# Patient Record
Sex: MALE | URBAN - METROPOLITAN AREA
[De-identification: names, ages, dates, MRNs, and addresses within clinical notes are randomized per-mention and may not be internally consistent; named-entity substitution may affect disease eponyms.]

---

## 2023-07-14 ENCOUNTER — OFFICE VISIT (OUTPATIENT)
Dept: PRIMARY CARE | Facility: CLINIC | Age: 53
End: 2023-07-14
Payer: COMMERCIAL

## 2023-07-14 VITALS
WEIGHT: 173 LBS | HEART RATE: 84 BPM | DIASTOLIC BLOOD PRESSURE: 62 MMHG | SYSTOLIC BLOOD PRESSURE: 94 MMHG | OXYGEN SATURATION: 96 % | BODY MASS INDEX: 26.3 KG/M2

## 2023-07-14 DIAGNOSIS — R05.3 CHRONIC COUGH: ICD-10-CM

## 2023-07-14 DIAGNOSIS — Z12.11 COLON CANCER SCREENING: ICD-10-CM

## 2023-07-14 DIAGNOSIS — K21.9 LARYNGOPHARYNGEAL REFLUX (LPR): Primary | ICD-10-CM

## 2023-07-14 DIAGNOSIS — J45.20 MILD INTERMITTENT ASTHMA, UNSPECIFIED WHETHER COMPLICATED (HHS-HCC): ICD-10-CM

## 2023-07-14 DIAGNOSIS — J30.0 VASOMOTOR RHINITIS: ICD-10-CM

## 2023-07-14 PROBLEM — S06.0X0A CONCUSSION WITHOUT LOSS OF CONSCIOUSNESS: Status: ACTIVE | Noted: 2023-07-14

## 2023-07-14 PROCEDURE — 99214 OFFICE O/P EST MOD 30 MIN: CPT | Performed by: INTERNAL MEDICINE

## 2023-07-14 PROCEDURE — 1036F TOBACCO NON-USER: CPT | Performed by: INTERNAL MEDICINE

## 2023-07-14 RX ORDER — OMEPRAZOLE 40 MG/1
40 CAPSULE, DELAYED RELEASE ORAL
Qty: 30 CAPSULE | Refills: 0 | Status: SHIPPED | OUTPATIENT
Start: 2023-07-14 | End: 2023-08-07 | Stop reason: SDUPTHER

## 2023-07-14 RX ORDER — ALBUTEROL SULFATE 90 UG/1
2 AEROSOL, METERED RESPIRATORY (INHALATION) EVERY 4 HOURS PRN
COMMUNITY
Start: 2018-05-21

## 2023-07-14 ASSESSMENT — PATIENT HEALTH QUESTIONNAIRE - PHQ9
2. FEELING DOWN, DEPRESSED OR HOPELESS: NOT AT ALL
1. LITTLE INTEREST OR PLEASURE IN DOING THINGS: NOT AT ALL
SUM OF ALL RESPONSES TO PHQ9 QUESTIONS 1 AND 2: 0

## 2023-07-14 NOTE — PROGRESS NOTES
Subjective   Patient ID: Nilay Fan is a 53 y.o. male who presents for Cough (Patient complaining of a cough, that caused him to pass out and hit his head).    HPI     Patient is a 53-year-old male with past history of asthma and chronic cough who presents with chief complaint of syncopal event this past Wednesday.  He states that he was coughing quite heavily and fell on his head.  He did not go to the emergency room.  He has a little bit of a headache but is improving.  No visual disturbances.  He states he has been having this chronic cough no for several years.  It seems to be dating back to 2015 at least in the records that are currently available.  He is supposed to be taking his albuterol as needed as well as Qvar for maintenance however he does not take the Qvar.  In fact he does not take any medications.  He states that the cough often is exacerbated by foods  No shortness of breath associated with it.  No trouble swallowing and no history of strokes    Review of Systems  Constitutional: No fever or chills  Cardiovascular: no chest pain, no palpitations and no syncope.   Respiratory: no cough, no shortness of breath during exertion and no shortness of breath at rest.   Gastrointestinal: no abdominal pain, no nausea and no vomiting.  Neuro: No Headache, no dizziness    Objective   BP 94/62   Pulse 84   Wt 78.5 kg (173 lb)   SpO2 96%   BMI 26.30 kg/m²     Physical Exam  Constitutional: Alert and in no acute distress. Well developed, well nourished  Head and Face: Head and face: Normal.    Cardiovascular: Heart rate and rhythm were normal, normal S1 and S2. No peripheral edema.   Pulmonary: Diffuse mild wheeze throughout  Musculoskeletal: Gait and station: Normal. Muscle strength/tone: Normal.   Skin: Normal skin color and pigmentation, normal skin turgor, and no rash.    Psychiatric: Judgment and insight: Intact. Mood and affect: Normal.        Lab Results   Component Value Date    WBC 4.7 11/20/2020     HGB 15.1 11/20/2020    HCT 46.9 11/20/2020     11/20/2020    CHOL 165 11/20/2020    TRIG 69 11/20/2020    HDL 44.5 11/20/2020    ALT 22 11/20/2020    AST 18 11/20/2020     11/20/2020    K 3.9 11/20/2020     11/20/2020    CREATININE 1.00 11/20/2020    BUN 14 11/20/2020    CO2 30 11/20/2020       No image results found.            Assessment/Plan   Problem List Items Addressed This Visit          Gastrointestinal and Abdominal    Laryngopharyngeal reflux (LPR) - Primary    Relevant Medications    omeprazole (PriLOSEC) 40 mg DR capsule       Pulmonary and Pneumonias    Chronic cough     Other Visit Diagnoses       Vasomotor rhinitis        Relevant Orders    Referral to ENT    Colon cancer screening        Relevant Orders    Cologuard® colon cancer screening    Mild intermittent asthma, unspecified whether complicated        Relevant Orders    Pulmonary function testing    XR chest 2 views

## 2023-07-14 NOTE — ASSESSMENT & PLAN NOTE
Patient with head injury greater than 5 days ago.  No focal neurological deficits.  Patient with mild headache.  Likely due to a concussion.  Advised against loud music and bright lights.  Try to get adequate sleep at night

## 2023-07-14 NOTE — ASSESSMENT & PLAN NOTE
There are many reasons for chronic cough.  In this case he has been adequately worked up.  Considering the symptoms are worse with meals we will trial a course of omeprazole 40 mg once daily in the morning.  Referral to ENT for possible endoscopic evaluation.  Check pulmonary function test given active wheezing and history of asthma and will also check chest x-ray.  Follow-up 30 days for reevaluation

## 2023-07-21 ENCOUNTER — APPOINTMENT (OUTPATIENT)
Dept: PRIMARY CARE | Facility: CLINIC | Age: 53
End: 2023-07-21
Payer: COMMERCIAL

## 2023-08-05 DIAGNOSIS — K21.9 LARYNGOPHARYNGEAL REFLUX (LPR): ICD-10-CM

## 2023-08-07 RX ORDER — OMEPRAZOLE 40 MG/1
40 CAPSULE, DELAYED RELEASE ORAL
Qty: 30 CAPSULE | Refills: 0 | Status: SHIPPED | OUTPATIENT
Start: 2023-08-07 | End: 2023-09-06

## 2023-08-15 ENCOUNTER — OFFICE VISIT (OUTPATIENT)
Dept: PRIMARY CARE | Facility: CLINIC | Age: 53
End: 2023-08-15
Payer: COMMERCIAL

## 2023-08-15 VITALS
BODY MASS INDEX: 26.46 KG/M2 | WEIGHT: 174 LBS | SYSTOLIC BLOOD PRESSURE: 98 MMHG | DIASTOLIC BLOOD PRESSURE: 61 MMHG | HEART RATE: 92 BPM

## 2023-08-15 DIAGNOSIS — R05.3 CHRONIC COUGH: ICD-10-CM

## 2023-08-15 DIAGNOSIS — J45.20 MILD INTERMITTENT ASTHMA, UNSPECIFIED WHETHER COMPLICATED (HHS-HCC): ICD-10-CM

## 2023-08-15 DIAGNOSIS — K21.9 LARYNGOPHARYNGEAL REFLUX (LPR): Primary | ICD-10-CM

## 2023-08-15 PROBLEM — J45.909 ASTHMA (HHS-HCC): Status: ACTIVE | Noted: 2023-08-15

## 2023-08-15 PROCEDURE — 99214 OFFICE O/P EST MOD 30 MIN: CPT | Performed by: INTERNAL MEDICINE

## 2023-08-15 PROCEDURE — 1036F TOBACCO NON-USER: CPT | Performed by: INTERNAL MEDICINE

## 2023-08-15 RX ORDER — METHYLPREDNISOLONE 4 MG/1
TABLET ORAL
Qty: 21 TABLET | Refills: 0 | Status: SHIPPED | OUTPATIENT
Start: 2023-08-15 | End: 2023-08-22

## 2023-08-15 NOTE — PROGRESS NOTES
Subjective   Patient ID: Nilay Fan is a 53 y.o. male who presents for Follow-up.    HPI     Patient is a 53-year-old male with past history of asthma and chronic cough who presents for follow-up.  Patient last visit was concerned about an ongoing cough.  There was concern whether this might be asthma related or pharyngeal reflux.  He was started on omeprazole 40 mg once daily and he states that his symptoms have improved substantially about 80% since then.  He states his coughing has improved.  Of note he was started on the Qvar however he has not been taking it.  He is also not been taking his albuterol.  He took a pulmonary function test showing mild obstruction without bronchodilator response.  He did not has no history of smoking.  His chest x-ray was within normal limits      Review of Systems  Constitutional: No fever or chills  Cardiovascular: no chest pain, no palpitations and no syncope.   Respiratory: no cough, no shortness of breath during exertion and no shortness of breath at rest.   Gastrointestinal: no abdominal pain, no nausea and no vomiting.  Neuro: No Headache, no dizziness    Objective   BP 98/61   Pulse 92   Wt 78.9 kg (174 lb)   BMI 26.46 kg/m²     Physical Exam  Constitutional: Alert and in no acute distress. Well developed, well nourished  Head and Face: Head and face: Normal.    Cardiovascular: Heart rate and rhythm were normal, normal S1 and S2. No peripheral edema.   Pulmonary: Diffuse mild wheeze throughout  Musculoskeletal: Gait and station: Normal. Muscle strength/tone: Normal.   Skin: Normal skin color and pigmentation, normal skin turgor, and no rash.    Psychiatric: Judgment and insight: Intact. Mood and affect: Normal.        Lab Results   Component Value Date    WBC 4.7 11/20/2020    HGB 15.1 11/20/2020    HCT 46.9 11/20/2020     11/20/2020    CHOL 165 11/20/2020    TRIG 69 11/20/2020    HDL 44.5 11/20/2020    ALT 22 11/20/2020    AST 18 11/20/2020     11/20/2020     K 3.9 11/20/2020     11/20/2020    CREATININE 1.00 11/20/2020    BUN 14 11/20/2020    CO2 30 11/20/2020       XR chest 2 views  Narrative: Interpreted By:  VALERIE TURNER MD  MRN: [id number]  Patient Name: FABI SALAS     STUDY:  TH CHEST 2 VIEW PA AND LAT;     INDICATION:  asthma.     COMPARISON:  None.     ACCESSION NUMBER(S):  [id number]     ORDERING CLINICIAN:  CHUCK LEPE     FINDINGS:  The cardiomediastinal silhouette size is within normal limits.     There is no focal consolidation, edema or pneumothorax.  No sizeable pleural effusion.     Impression: 1. No acute cardiopulmonary process.            Assessment/Plan   Problem List Items Addressed This Visit          Gastrointestinal and Abdominal    Laryngopharyngeal reflux (LPR) - Primary       Pulmonary and Pneumonias    Chronic cough     Improving.  There may be a component of laryngeal reflux.  He has an upcoming appointment with ear nose and throat.  Although considering he is actively wheezing it may be that his asthma is not well controlled.  Advised to use his Qvar regularly as well as albuterol as needed.  We will provide Medrol at this time for symptomatic relief         Asthma     Given his active wheezing is likely his symptoms or not well controlled.  Given the pulmonary function test showing mild obstruction without bronchodilator response the question is whether or not there is COPD.  We will provide Medrol at this time.  Advise regular use of his Qvar.  Would like to repeat the pulmonary function test in 3 months.  If there is still evidence of COPD may need pulmonary evaluation given no smoking history and evidence of mild obstruction         Relevant Medications    methylPREDNISolone (Medrol Dospak) 4 mg tablets

## 2023-08-15 NOTE — ASSESSMENT & PLAN NOTE
Given his active wheezing is likely his symptoms or not well controlled.  Given the pulmonary function test showing mild obstruction without bronchodilator response the question is whether or not there is COPD.  We will provide Medrol at this time.  Advise regular use of his Qvar.  Would like to repeat the pulmonary function test in 3 months.  If there is still evidence of COPD may need pulmonary evaluation given no smoking history and evidence of mild obstruction

## 2023-08-15 NOTE — ASSESSMENT & PLAN NOTE
Improving.  There may be a component of laryngeal reflux.  He has an upcoming appointment with ear nose and throat.  Although considering he is actively wheezing it may be that his asthma is not well controlled.  Advised to use his Qvar regularly as well as albuterol as needed.  We will provide Medrol at this time for symptomatic relief

## 2023-09-02 DIAGNOSIS — K21.9 LARYNGOPHARYNGEAL REFLUX (LPR): ICD-10-CM

## 2023-09-06 RX ORDER — OMEPRAZOLE 40 MG/1
40 CAPSULE, DELAYED RELEASE ORAL EVERY MORNING
Qty: 90 CAPSULE | Refills: 0 | Status: SHIPPED | OUTPATIENT
Start: 2023-09-06 | End: 2023-11-09 | Stop reason: SDUPTHER

## 2023-09-08 DIAGNOSIS — K21.9 LARYNGOPHARYNGEAL REFLUX (LPR): ICD-10-CM

## 2023-09-08 RX ORDER — OMEPRAZOLE 40 MG/1
40 CAPSULE, DELAYED RELEASE ORAL
Qty: 30 CAPSULE | OUTPATIENT
Start: 2023-09-08

## 2023-11-09 ENCOUNTER — LAB (OUTPATIENT)
Dept: LAB | Facility: LAB | Age: 53
End: 2023-11-09
Payer: COMMERCIAL

## 2023-11-09 ENCOUNTER — OFFICE VISIT (OUTPATIENT)
Dept: OTOLARYNGOLOGY | Facility: CLINIC | Age: 53
End: 2023-11-09
Payer: COMMERCIAL

## 2023-11-09 VITALS — BODY MASS INDEX: 23.99 KG/M2 | HEIGHT: 68 IN | TEMPERATURE: 97.2 F | WEIGHT: 158.3 LBS

## 2023-11-09 DIAGNOSIS — J31.0 CHRONIC RHINITIS: ICD-10-CM

## 2023-11-09 DIAGNOSIS — R09.81 NASAL CONGESTION: ICD-10-CM

## 2023-11-09 DIAGNOSIS — J34.89 NASAL OBSTRUCTION: ICD-10-CM

## 2023-11-09 DIAGNOSIS — J34.89 RHINORRHEA: ICD-10-CM

## 2023-11-09 DIAGNOSIS — K21.9 LARYNGOPHARYNGEAL REFLUX (LPR): Primary | ICD-10-CM

## 2023-11-09 DIAGNOSIS — J34.2 NASAL SEPTAL DEVIATION: ICD-10-CM

## 2023-11-09 PROCEDURE — 36415 COLL VENOUS BLD VENIPUNCTURE: CPT

## 2023-11-09 PROCEDURE — 1036F TOBACCO NON-USER: CPT | Performed by: OTOLARYNGOLOGY

## 2023-11-09 PROCEDURE — 82785 ASSAY OF IGE: CPT

## 2023-11-09 PROCEDURE — 99203 OFFICE O/P NEW LOW 30 MIN: CPT | Performed by: OTOLARYNGOLOGY

## 2023-11-09 PROCEDURE — 86003 ALLG SPEC IGE CRUDE XTRC EA: CPT

## 2023-11-09 PROCEDURE — 31231 NASAL ENDOSCOPY DX: CPT | Performed by: OTOLARYNGOLOGY

## 2023-11-09 RX ORDER — OMEPRAZOLE 40 MG/1
40 CAPSULE, DELAYED RELEASE ORAL EVERY MORNING
Qty: 90 CAPSULE | Refills: 0 | Status: SHIPPED | OUTPATIENT
Start: 2023-11-09

## 2023-11-09 RX ORDER — FLUTICASONE PROPIONATE 50 MCG
1 SPRAY, SUSPENSION (ML) NASAL 2 TIMES DAILY
Qty: 16 G | Refills: 11 | Status: SHIPPED | OUTPATIENT
Start: 2023-11-09 | End: 2024-11-08

## 2023-11-09 ASSESSMENT — PATIENT HEALTH QUESTIONNAIRE - PHQ9
2. FEELING DOWN, DEPRESSED OR HOPELESS: NOT AT ALL
1. LITTLE INTEREST OR PLEASURE IN DOING THINGS: NOT AT ALL
SUM OF ALL RESPONSES TO PHQ9 QUESTIONS 1 & 2: 0

## 2023-11-09 NOTE — PATIENT INSTRUCTIONS
"RAST allergy blood test  Flonase, 1 spray both sides of the nose twice daily  Omeprazole, 40 mg 30 minutes prior to dinner.      NASAL SPRAY USE INSTRUCTIONS    · Blow your nose on both sides to clear any debris or mucous  · Prime and activate the delivery device as recommended by the   · Position your head by tilting forward (this aligns the medication more vertically and makes it easier to use all the medication from the bottom of the bottle each month)  · Aim the applicator tip about 30-45 degrees from the floor of the nose and direct the spray to the outer corner of the eye on the same side (right side to outside of the right eye,       left side to outside of left eye)       - This technique helps to avoid spraying the septum (structure that divides one side of the nose from the other) that can cause irritation and bleeding     - Do not spray straight up or straight back into your nose         · NASAL STEROIDS (example: Flonase, Nasacort, Rhinocort) - Consistent utilization is important for maximal benefit (either 1 puff each nostril twice per day or 2 sprays each     nostril once per day)  · TOPICAL ANTIHISTAMINES (example: Azelastine, Patanase) and Ipratropium - can be utilized as needed for symptoms     “[facility] is pleased to meet your health care needs.  We strive to deliver the highest quality and most value based care possible.  Thank you for giving us the opportunity to serve you.”          LARYNGOPHARYNGEAL REFLUX (LPR) OR SILENT REFLUX  GERD occurs when stomach acid flows back up into the esophagus (swallowing tube). When the acid reaches the throat, it is called laryngopharyngeal reflux (LPR) or silent reflux. It is called \"silent\" because many people with LPR have no heartburn or stomach upset. LPR tends to become more prominent as people age, is often related to the timing and type of the diet, and may be the source of chronic symptoms.    LPR is one of the more common " causes of hoarseness and changes in voice. Acid reflux can cause inflammation and swelling in the throat or in the larynx (voice box) and can result in a number of different symptoms including: mild hoarseness which is typically worse in the morning, a sense of a foreign body or lump in the throat, a sense of mucous sticking, a need to frequently clear the throat. LPR may also cause low grade sore throat and chronic cough, particularly cough that wakes people up in the middle of the night as a result of acid irritation in the throat and larynx. Oftentimes LPR is mistaken for sinus drainage because of the sensation of mucous and post nasal drip.    The diagnosis of LPR as a cause of throat symptoms is usually based on classic symptoms and physical findings of inflammation in the throat in locations consistent with LPR (back part of the voicebox). Often the patient is treated for LPR without any further testing. More significant testing is usually not needed. However, on some occasion, some additional tests may be required such as a swallowing study with barium, an endoscopic evaluation of the esophagus and stomach, or a probe that measures acidity (pH) in the throat and esophagus.    The most important treatment of LPR is dietary control. A somewhat bland diet, smaller but more frequent meals, avoidance of alcohol, tobacco and caffeine, and not eating within 3-4 hours of bedtime are the most important factors. Avoidance of acidic and fatty foods and mint are also important. Elevation of the head of the bed and avoidance of tight, binding clothing is of value. A person with reflux who is overweight should reduce weight, and reducing stress also frequently improves the symptoms.  Regular use of a recommended or prescribed medication for at 2-3 months while observing dietary control is critical. Humidification, hydration, mucous thinners and avoidance of throat clearing are all of value for those people who have  significant throat symptoms from gastroesophageal reflux. Untreated LPR can lead to chronic swelling of the vocal folds, ulcerations of the vocal folds and formation of masses known as granulomas. LPR can also make asthma worse.    SIGNS AND SYMPTOMS:  -Hoarseness, chronic cough  -Sensation of lump in the throat,   -Vocal fold lesions or ulcerations, vocal fold swelling and irritation  -Frequent throat clearing,   -Mucous sticking in the throat, increased mucous production   -Worsening of asthma  -Low grade sore throat, worsening of sinus drainage or post nasal drip  -Tinnitus    LIFESTYLE MANAGEMENT:  Lifestyle changes:  -If you are overweight, talk with your health care provider about losing weight.   -If you smoke, quit! Your health care provider may have ideas to help you quit.   -Follow dietary guidelines listed below to reduce acid production.  -Implement physical measures listed below.    Dietary guidelines:   -Eat smaller, more frequent meals rather than large one.    -Avoid food or liquids for 2-3 hours before lying down (no bedtime snacks!)    -Avoid eating ANYTHING for 3-4 hours before bedtime   -Avoid or limit the following: caffeinated products: coffee, tea, sodas, chocolate; red sauces and salsa; fatty, fried, or greasy foods; citric juices: orange, grapefruit; spicy foods; mints: peppermint, spearmint; and alcohol    Physical measures:   -Elevate the head of the bed 6 inches by placing blocks or thick books under the legs of the head of the bed. Using extra pillows is NOT a good alternative.    -Avoid bending forward at the waist.    -Avoid wearing tight fitting clothing.

## 2023-11-09 NOTE — PROGRESS NOTES
History Of Present Illness  Nilay Fan is a 53 y.o. male presenting with globus sensation, posterior rhinorrhea.    Patient with history of reflux.  He states that his symptoms have been getting worse over the past month.  He has not been taking omeprazole for the past 2 weeks.  Frequent throat clearing.  He states that he has been doing this for decades.  No known environmental allergies, suspected dust allergy.  Has not tried any nasal sprays or antihistamines.     Patient is accompanied by his wife who helps in providing history.     Past Medical History  He has no past medical history on file.    Surgical History  He has a past surgical history that includes Other surgical history (11/20/2020).     Social History  He reports that he has never smoked. He has never been exposed to tobacco smoke. He has never used smokeless tobacco. He reports that he does not currently use alcohol. He reports that he does not use drugs.    Family History  No family history on file.     Allergies  House dust    Review of Systems   Constitutional: Negative.    HENT: Negative.     Eyes: Negative.    Respiratory: Negative.     Cardiovascular: Negative.    Gastrointestinal: Negative.    Endocrine: Negative.    Genitourinary: Negative.    Musculoskeletal: Negative.    Skin: Negative.    Allergic/Immunologic: Negative.    Neurological: Negative.    Hematological: Negative.    Psychiatric/Behavioral: Negative.     These systems were reviewed and are negative unless otherwise stated in the HPI       Physical Exam    Constitutional   General appearance: Healthy-appearing, well-nourished, well groomed, in no acute distress.      Voice  Ability to communicate: Normal communication without aids, normal voice quality    Head and Face   Head and face: Atraumatic with no masses, lesions, or scarring.    Salivary glands: No tenderness of the parotid glands or parotid masses. No tenderness of the submandibular glands or submandibular masses.     Facial strength: Normal strength and symmetry, no synkinesis or facial tic.     Eyes   Pupils and irises: EOM intact, PERRLA, conjunctiva non-injected.     Ears  Otoscopic examination: Auditory canals with normal appearance and no obstruction or erythema. Membranes mobile per pneumatic otoscopy, pearly grey, with clear landmarks. No evidence of middle ear effusion.  External inspection of ears: Ears normally formed and free of lesions.     Nose   External inspection of nose: Dorsum symmetric with no visible or palpable deformities.  No nasal lesions, lacerations, or scars. Nasal tip symmetrical with normal nasal valves.    Nasal mucosa, septum, and turbinates: Mucosa edematous, pink and moist.  Septum deviated to the left.  Bilateral inferior turbinate hypertrophy.  No polyps.     Oral Cavity/Mouth   Lips, teeth, and gums: Normal lips, gums, and dentition.     Throat   Oropharynx: Mucosa moist, no lesions. Hard and soft palate normal. Tongue normal, no lesions or edema. No tonsillar masses or lesions. Normal tongue base.    Neck   Neck: Symmetrical, trachea midline.    Thyroid symmetrical, no enlargement, no tenderness, no nodules.     Pulmonary   Respiratory effort: Chest expands symmetrically. No audible wheeze.      Cardiovascular   Peripheral vascular system: No varicosities, carotid pulse normal, no edema. No jugular venous distension    Lymphatic   Palpation of cervical lymph nodes: No palpable lymph node enlargement, no submandibular adenopathy, no anterior cervical adenopathy, no supraclavicular adenopathy    Neurological/Psychiatric   Cranial nerves: Cranial nerves II - XII intact. Normal gait. No nystagmus  Orientation to person, place, and time: Normal.     Mood and affect: Normal.      Extremities   Appearance of extremities: Normal.        Procedure  After verbal consent  and topical anesthesia a nasal endoscopy was performed.  No lesions in the nasal cavity, middle meatus or sphenoethmoid recess were  "seen.  No purulence was noted.      Due to an  excessive gag reflex after verbal consent a flexible laryngoscopy was performed.  No lesions in the nasal cavity, nasopharynx, oropharynx, hypopharynx or larynx were seen.  Normal TVC mobility was appreciated.    Moderate interarytenoid edema.  Septal deviation to the left.  Moderate edema of the nasal mucosa.       Last Recorded Vitals  Temperature 36.2 °C (97.2 °F), height 1.727 m (5' 8\"), weight 71.8 kg (158 lb 4.8 oz).    Relevant Results  Prior to Admission medications    Medication Sig Start Date End Date Taking? Authorizing Provider   albuterol 90 mcg/actuation inhaler Inhale 2 puffs every 4 hours if needed for shortness of breath. 5/21/18  Yes Historical Provider, MD   beclomethasone dipropionate (Qvar) 80 mcg/actuation inhaler Inhale 1 Inhalation twice a day. 5/21/18  Yes Historical Provider, MD   omeprazole (PriLOSEC) 40 mg DR capsule Take 1 capsule (40 mg) by mouth once daily in the morning. Take before meal. Do not crush or chew. 9/6/23  Yes Jose Conley, DO     No results found.      Assessment/Plan   @  Problem List Items Addressed This Visit          ENT    Chronic rhinitis    Nasal septal deviation    Nasal congestion    Relevant Orders    Respiratory Allergy Profile IgE    Nasal obstruction    Rhinorrhea       Gastrointestinal and Abdominal    Laryngopharyngeal reflux (LPR) - Primary      RAST allergy blood test  Flonase, 1 spray both sides of the nose twice daily  Omeprazole, 40 mg 30 minutes prior to dinner.  Instructions given to the patient for reflux management and appropriate use of nasal sprays.  We discussed the expectation for symptoms to improve over the next approximately 6 weeks.  Patient will notify me should his symptoms fail to improve or worsen and I would be happy to see him at any time.      Jonathan K Frankel, MD  Facial Plastic & Reconstructive Surgery  Otolaryngology - Head & Neck Surgery  "

## 2023-11-10 LAB
A ALTERNATA IGE QN: <0.1 KU/L
A FUMIGATUS IGE QN: <0.1 KU/L
BERMUDA GRASS IGE QN: <0.1 KU/L
BOXELDER IGE QN: <0.1 KU/L
C HERBARUM IGE QN: <0.1 KU/L
CALIF WALNUT POLN IGE QN: <0.1 KU/L
CAT DANDER IGE QN: 0.18 KU/L
CMN PIGWEED IGE QN: <0.1 KU/L
COMMON RAGWEED IGE QN: <0.1 KU/L
COTTONWOOD IGE QN: <0.1 KU/L
D FARINAE IGE QN: 0.14 KU/L
D PTERONYSS IGE QN: 0.16 KU/L
DOG DANDER IGE QN: <0.1 KU/L
ENGL PLANTAIN IGE QN: <0.1 KU/L
GOOSEFOOT IGE QN: <0.1 KU/L
JOHNSON GRASS IGE QN: <0.1 KU/L
KENT BLUE GRASS IGE QN: <0.1 KU/L
LONDON PLANE IGE QN: <0.1 KU/L
MT JUNIPER IGE QN: <0.1 KU/L
P NOTATUM IGE QN: <0.1 KU/L
PECAN/HICK TREE IGE QN: <0.1 KU/L
ROACH IGE QN: <0.1 KU/L
SALTWORT IGE QN: <0.1 KU/L
SHEEP SORREL IGE QN: <0.1 KU/L
SILVER BIRCH IGE QN: 0.32 KU/L
TIMOTHY IGE QN: <0.1 KU/L
TOTAL IGE SMQN RAST: 39.8 KU/L
WHITE ASH IGE QN: <0.1 KU/L
WHITE ELM IGE QN: <0.1 KU/L
WHITE MULBERRY IGE QN: <0.1 KU/L
WHITE OAK IGE QN: 1.27 KU/L

## 2023-11-17 ENCOUNTER — APPOINTMENT (OUTPATIENT)
Dept: PRIMARY CARE | Facility: CLINIC | Age: 53
End: 2023-11-17
Payer: COMMERCIAL

## 2024-06-27 DIAGNOSIS — Z12.11 COLON CANCER SCREENING: ICD-10-CM

## 2024-06-27 RX ORDER — POLYETHYLENE GLYCOL 3350, SODIUM CHLORIDE, SODIUM BICARBONATE, POTASSIUM CHLORIDE 420; 11.2; 5.72; 1.48 G/4L; G/4L; G/4L; G/4L
POWDER, FOR SOLUTION ORAL
Qty: 4000 ML | Refills: 0 | Status: SHIPPED | OUTPATIENT
Start: 2024-06-27

## 2024-07-02 ENCOUNTER — APPOINTMENT (OUTPATIENT)
Dept: GASTROENTEROLOGY | Facility: EXTERNAL LOCATION | Age: 54
End: 2024-07-02
Payer: COMMERCIAL

## 2024-07-02 DIAGNOSIS — D12.3 BENIGN NEOPLASM OF TRANSVERSE COLON: Primary | ICD-10-CM

## 2024-07-02 DIAGNOSIS — K57.30 DIVERTICULOSIS OF LARGE INTESTINE WITHOUT DIVERTICULITIS: ICD-10-CM

## 2024-07-02 DIAGNOSIS — Z12.11 ENCOUNTER FOR SCREENING FOR MALIGNANT NEOPLASM OF COLON: ICD-10-CM

## 2024-07-02 PROCEDURE — 88305 TISSUE EXAM BY PATHOLOGIST: CPT

## 2024-07-02 PROCEDURE — 45385 COLONOSCOPY W/LESION REMOVAL: CPT | Performed by: INTERNAL MEDICINE

## 2024-07-02 NOTE — PROGRESS NOTES
Colonoscopy performed today 7/2/2024 at the [facility] ([facility]).  See procedure report(s) under Media tab.

## 2024-07-08 ENCOUNTER — LAB REQUISITION (OUTPATIENT)
Dept: LAB | Facility: HOSPITAL | Age: 54
End: 2024-07-08
Payer: COMMERCIAL

## 2024-07-15 LAB
LABORATORY COMMENT REPORT: NORMAL
PATH REPORT.FINAL DX SPEC: NORMAL
PATH REPORT.GROSS SPEC: NORMAL
PATH REPORT.RELEVANT HX SPEC: NORMAL
PATH REPORT.TOTAL CANCER: NORMAL